# Patient Record
Sex: MALE | Race: WHITE
[De-identification: names, ages, dates, MRNs, and addresses within clinical notes are randomized per-mention and may not be internally consistent; named-entity substitution may affect disease eponyms.]

---

## 2018-07-31 ENCOUNTER — HOSPITAL ENCOUNTER (INPATIENT)
Dept: HOSPITAL 62 - ER | Age: 68
LOS: 3 days | Discharge: LEFT BEFORE DISCHARGE | DRG: 682 | End: 2018-08-03
Attending: HOSPITALIST | Admitting: HOSPITALIST
Payer: COMMERCIAL

## 2018-07-31 DIAGNOSIS — M62.82: ICD-10-CM

## 2018-07-31 DIAGNOSIS — Z86.718: ICD-10-CM

## 2018-07-31 DIAGNOSIS — G93.41: ICD-10-CM

## 2018-07-31 DIAGNOSIS — E11.9: ICD-10-CM

## 2018-07-31 DIAGNOSIS — N17.9: Primary | ICD-10-CM

## 2018-07-31 PROCEDURE — 99285 EMERGENCY DEPT VISIT HI MDM: CPT

## 2018-07-31 PROCEDURE — 81001 URINALYSIS AUTO W/SCOPE: CPT

## 2018-07-31 PROCEDURE — 80307 DRUG TEST PRSMV CHEM ANLYZR: CPT

## 2018-07-31 PROCEDURE — 93005 ELECTROCARDIOGRAM TRACING: CPT

## 2018-07-31 PROCEDURE — 82550 ASSAY OF CK (CPK): CPT

## 2018-07-31 PROCEDURE — 36415 COLL VENOUS BLD VENIPUNCTURE: CPT

## 2018-07-31 PROCEDURE — G0378 HOSPITAL OBSERVATION PER HR: HCPCS

## 2018-07-31 PROCEDURE — 84484 ASSAY OF TROPONIN QUANT: CPT

## 2018-07-31 PROCEDURE — 71045 X-RAY EXAM CHEST 1 VIEW: CPT

## 2018-07-31 PROCEDURE — 93010 ELECTROCARDIOGRAM REPORT: CPT

## 2018-07-31 PROCEDURE — 80053 COMPREHEN METABOLIC PANEL: CPT

## 2018-07-31 PROCEDURE — 82962 GLUCOSE BLOOD TEST: CPT

## 2018-07-31 PROCEDURE — 96360 HYDRATION IV INFUSION INIT: CPT

## 2018-07-31 PROCEDURE — 85025 COMPLETE CBC W/AUTO DIFF WBC: CPT

## 2018-07-31 PROCEDURE — 96361 HYDRATE IV INFUSION ADD-ON: CPT

## 2018-07-31 PROCEDURE — 70450 CT HEAD/BRAIN W/O DYE: CPT

## 2018-07-31 PROCEDURE — 82140 ASSAY OF AMMONIA: CPT

## 2018-08-01 LAB
ADD MANUAL DIFF: NO
ALBUMIN SERPL-MCNC: 5.1 G/DL (ref 3.5–5)
ALP SERPL-CCNC: 73 U/L (ref 38–126)
ALT SERPL-CCNC: 101 U/L (ref 21–72)
ANION GAP SERPL CALC-SCNC: 18 MMOL/L (ref 5–19)
APAP SERPL-MCNC: < 10 UG/ML (ref 10–30)
APPEARANCE UR: (no result)
APTT PPP: (no result) S
AST SERPL-CCNC: 100 U/L (ref 17–59)
BARBITURATES UR QL SCN: NEGATIVE
BASOPHILS # BLD AUTO: 0 10^3/UL (ref 0–0.2)
BASOPHILS NFR BLD AUTO: 0.2 % (ref 0–2)
BILIRUB DIRECT SERPL-MCNC: 0.4 MG/DL (ref 0–0.4)
BILIRUB SERPL-MCNC: 2 MG/DL (ref 0.2–1.3)
BILIRUB UR QL STRIP: NEGATIVE
BUN SERPL-MCNC: 34 MG/DL (ref 7–20)
CALCIUM: 10 MG/DL (ref 8.4–10.2)
CHLORIDE SERPL-SCNC: 105 MMOL/L (ref 98–107)
CK SERPL-CCNC: 2399 U/L (ref 55–170)
CO2 SERPL-SCNC: 24 MMOL/L (ref 22–30)
EOSINOPHIL # BLD AUTO: 0 10^3/UL (ref 0–0.6)
EOSINOPHIL NFR BLD AUTO: 0.2 % (ref 0–6)
ERYTHROCYTE [DISTWIDTH] IN BLOOD BY AUTOMATED COUNT: 13.4 % (ref 11.5–14)
ETHANOL SERPL-MCNC: < 10 MG/DL
GLUCOSE SERPL-MCNC: 150 MG/DL (ref 75–110)
GLUCOSE UR STRIP-MCNC: NEGATIVE MG/DL
HCT VFR BLD CALC: 49.9 % (ref 37.9–51)
HGB BLD-MCNC: 16.9 G/DL (ref 13.5–17)
KETONES UR STRIP-MCNC: (no result) MG/DL
LYMPHOCYTES # BLD AUTO: 1.8 10^3/UL (ref 0.5–4.7)
LYMPHOCYTES NFR BLD AUTO: 13.2 % (ref 13–45)
MCH RBC QN AUTO: 30.9 PG (ref 27–33.4)
MCHC RBC AUTO-ENTMCNC: 33.8 G/DL (ref 32–36)
MCV RBC AUTO: 91 FL (ref 80–97)
METHADONE UR QL SCN: NEGATIVE
MONOCYTES # BLD AUTO: 1.2 10^3/UL (ref 0.1–1.4)
MONOCYTES NFR BLD AUTO: 9 % (ref 3–13)
NEUTROPHILS # BLD AUTO: 10.6 10^3/UL (ref 1.7–8.2)
NEUTS SEG NFR BLD AUTO: 77.4 % (ref 42–78)
NITRITE UR QL STRIP: NEGATIVE
PCP UR QL SCN: NEGATIVE
PH UR STRIP: 5 [PH] (ref 5–9)
PLATELET # BLD: 290 10^3/UL (ref 150–450)
POTASSIUM SERPL-SCNC: 4.6 MMOL/L (ref 3.6–5)
PROT SERPL-MCNC: 8.5 G/DL (ref 6.3–8.2)
PROT UR STRIP-MCNC: 30 MG/DL
RBC # BLD AUTO: 5.46 10^6/UL (ref 4.35–5.55)
SALICYLATES SERPL-MCNC: < 1 MG/DL (ref 2–20)
SODIUM SERPL-SCNC: 146.9 MMOL/L (ref 137–145)
SP GR UR STRIP: 1.02
TOTAL CELLS COUNTED % (AUTO): 100 %
URINE AMPHETAMINES SCREEN: NEGATIVE
URINE BENZODIAZEPINES SCREEN: NEGATIVE
URINE COCAINE SCREEN: NEGATIVE
URINE MARIJUANA (THC) SCREEN: NEGATIVE
UROBILINOGEN UR-MCNC: 2 MG/DL (ref ?–2)
WBC # BLD AUTO: 13.7 10^3/UL (ref 4–10.5)

## 2018-08-01 PROCEDURE — 3E0F73Z INTRODUCTION OF ANTI-INFLAMMATORY INTO RESPIRATORY TRACT, VIA NATURAL OR ARTIFICIAL OPENING: ICD-10-PCS | Performed by: HOSPITALIST

## 2018-08-01 RX ADMIN — SODIUM CHLORIDE PRN MLS/HR: 9 INJECTION, SOLUTION INTRAVENOUS at 02:13

## 2018-08-01 RX ADMIN — DOXYCYCLINE SCH MLS/HR: 100 INJECTION, POWDER, LYOPHILIZED, FOR SOLUTION INTRAVENOUS at 10:44

## 2018-08-01 RX ADMIN — HALOPERIDOL LACTATE PRN MG: 5 INJECTION, SOLUTION INTRAMUSCULAR at 10:45

## 2018-08-01 RX ADMIN — ENOXAPARIN SODIUM SCH MG: 30 INJECTION SUBCUTANEOUS at 10:45

## 2018-08-01 RX ADMIN — DOXYCYCLINE SCH MLS/HR: 100 INJECTION, POWDER, LYOPHILIZED, FOR SOLUTION INTRAVENOUS at 22:04

## 2018-08-01 RX ADMIN — HALOPERIDOL LACTATE PRN MG: 5 INJECTION, SOLUTION INTRAMUSCULAR at 18:15

## 2018-08-01 RX ADMIN — HALOPERIDOL LACTATE PRN MG: 5 INJECTION, SOLUTION INTRAMUSCULAR at 13:09

## 2018-08-01 NOTE — EKG REPORT
SEVERITY:- OTHERWISE NORMAL ECG -

SINUS TACHYCARDIA

:

Confirmed by: Ruben Levin 01-Aug-2018 21:21:00

## 2018-08-01 NOTE — ER DOCUMENT REPORT
ED General





- General


Chief Complaint: Altered Mental Status


Stated Complaint: ALTERED MENTAL STATUS


Time Seen by Provider: 08/01/18 00:00


Notes: 





Patient is a 68-year-old male who comes emergency department for chief 

complaint of altered mental status.  He is here with his grandson.  Grandson 

stays with him, states he last saw him yesterday, states he came home today and 

found him walking around in the yard swatting at things that were not there.  

He was completely confused.  He is normally completely oriented and does not 

have a history of dementia.  No trauma, fever, or other abnormalities reported.

  On my examination patient will follow directions but he is unable to answer 

questions about what happened or where he is.  Past medical history of insulin-

dependent diabetes, son states that he was complaining of pain over his left 

ribs after he pulled a muscle (denies any fall or trauma), states he was 

prescribed this a couple of days ago and did not take more than the prescribed 

amount as far as he knows.  He comes by EMS.





Past Medical History





- General


Information source: Relative - grandson





- Social History


Smoking Status: Never Smoker


Frequency of alcohol use: None


Drug Abuse: None


Lives with: Family


Family History: Reviewed & Not Pertinent


Patient has suicidal ideation: No


Patient has homicidal ideation: No





- Past Medical History


Cardiac Medical History: Reports: Hx DVT


Endocrine Medical History: Reports: Hx Diabetes Mellitus Type 2


Renal/ Medical History: Denies: Hx Peritoneal Dialysis





- Immunizations


Hx Diphtheria, Pertussis, Tetanus Vaccination: Yes





Review of Systems





- Review of Systems


Constitutional: See HPI


EENT: No symptoms reported


Cardiovascular: No symptoms reported


Respiratory: No symptoms reported


Gastrointestinal: No symptoms reported


Genitourinary: No symptoms reported


Male Genitourinary: No symptoms reported


Musculoskeletal: No symptoms reported


Skin: No symptoms reported


Hematologic/Lymphatic: No symptoms reported


Neurological/Psychological: See HPI





Physical Exam





- Vital signs


Vitals: 


 











Temp Pulse Resp BP Pulse Ox


 


 97.6 F   102 H  19   123/74   90 L


 


 07/31/18 23:59  07/31/18 23:59  07/31/18 23:59  07/31/18 23:59  07/31/18 23:59














- Notes


Notes: 





GENERAL: Alert, no distress, disheveled appearance


HEAD: Normocephalic, atraumatic.


EYES: Pupils equal, round, and reactive to light. Extraocular movements intact.


ENT: Oral mucosa moist, tongue midline. 


NECK: Full range of motion. Supple. Trachea midline.


LUNGS: Clear to auscultation bilaterally, no wheezes, rales, or rhonchi. No 

respiratory distress.  Occasional mild cough.


HEART: Tachycardic, normal rhythm, no murmur


ABDOMEN: Soft, non-tender. Non-distended. Bowel sounds present in all 4 

quadrants.


EXTREMITIES: Moves all 4 extremities spontaneously. No edema, normal radial and 

dorsalis pedis pulses bilaterally. No cyanosis.


BACK: no cervical, thoracic, lumbar midline tenderness. No saddle anesthesia, 

normal distal neurovascular exam. 


NEUROLOGICAL: Patient is alert, he follows directions, however he is confused.  

He is unable to tell me where he is, who he is with, or the events of today.  

Normal upper and lower extremity strength, neurovascular exam.  Cranial nerves 

grossly intact.


SKIN: Warm, dry, normal turgor. No rashes or lesions noted.








Course





- Re-evaluation


Re-evalutation: 


Patient is altered clearly on exam although he will follow directions.  CAT 

scan of the head unremarkable, chest x-ray with no overt abnormality.  Patient 

has no concerning physical exam findings, has mild cough, has had cough for a 

few days, mild leukocytosis, no fever.  No overt evidence of pneumonia.  

Borderline hypoxia when he falls asleep, placed on 2 L nasal cannula and this 

resolved.





Chemistry shows renal insufficiency, LFTs mildly elevated, direct bilirubin 

unremarkable, nontender abdomen.  CK is elevated at greater than 2300, troponin 

indeterminate.  Patient given IV fluids, will be given more.  Urinalysis shows 

dehydration but no infection.





Patient has improved since he arrived, he is more cooperative and calm, however 

he remains confused.  Unsure if this is metabolic, medication related, or other 

etiology.  Will admit for altered mental status, acute renal insufficiency, 

rhabdomyolysis.  Discussed this with grandson.  Patient actually states 

agreement with staying as well.





Discussed with Dr. Barriga, patient will be admitted to telemetry full admission.





- Vital Signs


Vital signs: 


 











Temp Pulse Resp BP Pulse Ox


 


 97.8 F   86   20   135/71 H  97 


 


 08/01/18 04:49  08/01/18 04:49  08/01/18 04:49  08/01/18 04:49  08/01/18 04:49














- Laboratory


Result Diagrams: 


 07/31/18 22:36





 08/01/18 00:50


Laboratory results interpreted by me: 


 











  07/31/18 08/01/18 08/01/18





  22:36 00:37 00:50


 


WBC  13.7 H  


 


Absolute Neutrophils  10.6 H  


 


Sodium    146.9 H


 


BUN    34 H


 


Creatinine    1.70 H


 


Est GFR ( Amer)    49 L


 


Est GFR (Non-Af Amer)    40 L


 


Glucose    150 H


 


Total Bilirubin    2.0 H


 


AST    100 H


 


ALT    101 H


 


Creatine Kinase    2399 H


 


Total Protein    8.5 H


 


Albumin    5.1 H


 


Urine Protein   30 H 


 


Urine Ketones   TRACE H 


 


Urine Blood   SMALL H 


 


Urine Urobilinogen   2.0 H 


 


Salicylates    < 1.0 L


 


Acetaminophen    < 10 L














Discharge





- Discharge


Clinical Impression: 


 Acute renal insufficiency





Altered mental status


Qualifiers:


 Altered mental status type: unspecified Qualified Code(s): R41.82 - Altered 

mental status, unspecified





Rhabdomyolysis


Qualifiers:


 Rhabdomyolysis type: non-traumatic Qualified Code(s): M62.82 - Rhabdomyolysis





Condition: Stable


Disposition: ADMITTED AS INPATIENT


Admitting Provider: Hospitalist


Unit Admitted: Telemetry

## 2018-08-01 NOTE — PROGRESS NOTE
Provider Note


Provider Note: 


This is 68 years old  male patient admitted this morning for altered 

mental status.  So far no explanation for his acute mental status changes 

except he is dehydrated and has acute kidney injury.  Accept this patient and 

his primary attending.

## 2018-08-01 NOTE — RADIOLOGY REPORT (SQ)
CT HEAD NON CONTRAST



Clinical history: 

Acute mental status change



Technique: 

Multiple axial images are taken from the level the vertex down to

the base of the skull  without the use of IV contrast. Sagittal

and coronal reconstructed images are also performed. Exam was

performed using the lowest radiation dose possible to allow for

diagnostic images.



This exam was performed according to our departmental

dose-optimization program which includes use of Automated

Exposure Control, adjustment of the mA and/or kV according to

patient size and/or use of iterative reconstruction technique. 



Comparison:

None.



Findings:



There is no evidence for acute intraparenchymal hemorrhage. 

There is no midline shift. 

There is no abnormal extra-axial fluid collection. 

Ventricles measure within normal limits. 

Sinuses are well aerated. 

Mastoid air cells well aerated. 

No evidence for depressed calvarial skull fracture. 

Soft tissues are grossly unremarkable.



Impression:

No noncontrast CT evidence for acute intracranial pathology.

## 2018-08-01 NOTE — PDOC H&P
History of Present Illness


Admission Date/PCP: 


  08/01/18 02:25





  





Patient complains of: Altered mental status


History of Present Illness: 


NANCI ZHANG is a 68 year old male with a past medical history of diabetes 

who comes emergency department for chief complaint of altered mental status.  

He is here with his grandson.  Grandson stays with him, states he last saw him 

yesterday, states he came home today and found him walking around in the yard 

swatting at things that were not there.  He was completely confused.  He is 

normally completely oriented and does not have a history of dementia.  No trauma

, fever, or other abnormalities reported.  Patient is able to follow directions 

but he is unable to answer questions about what happened or where he is.  As 

per his son states that he was complaining of pain over his left ribs after he 

pulled a muscle (denies any fall or trauma), states he was prescribed Flexeril  

a couple of days ago and did not take more than the prescribed amount as far as 

he knows.  No history of abdominal pain no chest pain or shortness of breath 

nausea vomiting or diarrhea.  No history of urinary symptoms.  No focal 

weakness or numbness.





On arrival to the emergency nasal vitals are stable.  His laboratory workup 

showed white count of 13,000.  His chemistry showed sodium of 147 with BUN of 

34 and creatinine was 1.7.  No baseline labs available to compare.  His CK was 

2400.  Troponin was negative.  UA was unremarkable.  Urine toxicology was 

unremarkable.  CT head was unremarkable.  Chest x-ray shows cardiomegaly 

without acute process.


Patient was referred to hospital service for further workup of altered mental 

status.








Past Medical History


Cardiac Medical History: Reports: DVT


Endocrine Medical History: Reports: Diabetes Mellitus Type 2





Past Surgical History


Past Surgical History: Reports: None





Social History


Information Source: Relative


Lives with: Family


Smoking Status: Never Smoker


Last Time Smoked: 30 years ago


Frequency of Alcohol Use: None


Hx Recreational Drug Use: No


Drugs: None


Hx Prescription Drug Abuse: No





Family History


Family History: Reviewed & Not Pertinent


Parental Family History Reviewed: No


Children Family History Reviewed: No


Sibling(s) Family History Reviewed.: No





Review of Systems


All systems: reviewed and no additional remarkable complaints except as stated





Physical Exam


Vital Signs: 


 











Temp Pulse Resp BP Pulse Ox


 


 97.8 F   86   20   135/71 H  97 


 


 08/01/18 04:49  08/01/18 04:49  08/01/18 04:49  08/01/18 04:49  08/01/18 04:49








 Intake & Output











 07/30/18 07/31/18 08/01/18





 06:59 06:59 06:59


 


Weight   210 lb 8.663 oz











General appearance: PRESENT: no acute distress, well-developed, well-nourished


Head exam: PRESENT: atraumatic, normocephalic


Eye exam: ABSENT: conjunctival injection, conjunctiva pink, conjunctiva pale, 

EOMI, nystagmus, periorbital swelling, PERRLA, scleral icterus, other


Ear exam: ABSENT: bleeding, drainage, normal external ear exam, TM's normal 

bilaterally, other


Mouth exam: PRESENT: moist


Teeth exam: ABSENT: dental caries, dental tenderness, edentulous, poor dentation

, other


Neck exam: ABSENT: carotid bruit, JVD, meningismus, thyromegaly


Respiratory exam: PRESENT: clear to auscultation nevaeh.  ABSENT: crackles, rhonchi


Cardiovascular exam: PRESENT: RRR, +S1, +S2.  ABSENT: gallop, rubs, systolic 

murmur


GI/Abdominal exam: PRESENT: normal bowel sounds, soft.  ABSENT: organolmegaly, 

tenderness


Rectal exam: PRESENT: deferred


Gentrourinary exam: ABSENT: ecchymosis, erythema, lacerations, lesions, scrotal 

swelling, testicular tenderness, urethral discharge, indwelling catheter, other


Extremities exam: ABSENT: calf tenderness, clubbing, full ROM, joint swelling, 

pedal edema, tenderness, +1 edema, +2 edema, other


Musculoskeletal exam: PRESENT: ambulatory


Neurological exam: PRESENT: alert, awake, CN II-XII grossly intact.  ABSENT: 

oriented to place, oriented to time, oriented to situation, motor sensory 

deficit


Psychiatric exam: ABSENT: homicidal ideation, suicidal ideation


Focused psych exam: PRESENT: delusional, restlessness


Skin exam: PRESENT: rash





Results


Laboratory Results: 


Labs reviewed.





Impressions: 


 





Chest X-Ray  08/01/18 00:08


IMPRESSION:


Cardiomegaly which may be accentuated by low lung volumes.


 











Status: Image reviewed by me





Assessment & Plan





- Diagnosis


(1) Altered mental status


Qualifiers: 


   Altered mental status type: disorientation   Qualified Code(s): R41.0 - 

Disorientation, unspecified   


Is this a current diagnosis for this admission?: Yes   


Plan: 


Patient with confusion and disorientation of unknown etiology.  CT head is 

negative for acute process.  No focal deficit on exam.  Likely metabolic 

secondary to renal insufficiency and rhabdomyolysis.  No fever or signs of 

meningitis.  We will continue frequent neuro check.  Continue IV fluids.  Hold 

Flexeril.  Hold sedating medication.  If no change in mental status we will 

consider MRI.








(2) Acute renal insufficiency


Is this a current diagnosis for this admission?: Yes   


Plan: 


Unknown baseline creatinine.  Will continue IV fluid and recheck BMP in a.m.








(3) Rhabdomyolysis


Qualifiers: 


   Rhabdomyolysis type: non-traumatic   Qualified Code(s): M62.82 - 

Rhabdomyolysis   


Is this a current diagnosis for this admission?: Yes   


Plan: 


Patient with rhabdomyolysis on unknown etiology.  Continue IV fluids.  Patient 

has mild renal insufficiency as well.  Will recheck CK in the morning..








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


Is this a current diagnosis for this admission?: No   


Plan: 


Patient is currently on insulin.  We will continue insulin sliding scale.








- Time


Time Spent: 50 to 70 Minutes





- Inpatient Certification


Based on my medical assessment, after consideration of the patient's 

comorbidities, presenting symptoms, or acuity I expect that the services needed 

warrant INPATIENT care.: No


I certify that my determination is in accordance with my understanding of 

Medicare's requirements for reasonable and necessary INPATIENT services [42 CFR 

412.3e].: No

## 2018-08-01 NOTE — RADIOLOGY REPORT (SQ)
CHEST XRAY 



CLINICAL HISTORY: Chest pain



COMPARISON: None.



TECHNIQUE: 2  view chest is submitted for review.



FINDINGS:

Lung volumes are low without evidence for acute infiltrate or

effusion. Eventration of the right hemidiaphragm is demonstrated.

The cardiac silhouette is enlarged,  Pulmonary vascularity is

unremarkable. Osseous structures are within expected limits for

patients age. .



IMPRESSION:

Cardiomegaly which may be accentuated by low lung volumes.

## 2018-08-02 LAB
ADD MANUAL DIFF: NO
ALBUMIN SERPL-MCNC: 4 G/DL (ref 3.5–5)
ALP SERPL-CCNC: 55 U/L (ref 38–126)
ALT SERPL-CCNC: 86 U/L (ref 21–72)
ANION GAP SERPL CALC-SCNC: 11 MMOL/L (ref 5–19)
AST SERPL-CCNC: 93 U/L (ref 17–59)
BASOPHILS # BLD AUTO: 0 10^3/UL (ref 0–0.2)
BASOPHILS NFR BLD AUTO: 0.3 % (ref 0–2)
BILIRUB DIRECT SERPL-MCNC: 0.3 MG/DL (ref 0–0.4)
BILIRUB SERPL-MCNC: 1.7 MG/DL (ref 0.2–1.3)
BUN SERPL-MCNC: 23 MG/DL (ref 7–20)
CALCIUM: 8.7 MG/DL (ref 8.4–10.2)
CHLORIDE SERPL-SCNC: 105 MMOL/L (ref 98–107)
CK SERPL-CCNC: 1413 U/L (ref 55–170)
CO2 SERPL-SCNC: 27 MMOL/L (ref 22–30)
EOSINOPHIL # BLD AUTO: 0.1 10^3/UL (ref 0–0.6)
EOSINOPHIL NFR BLD AUTO: 1.7 % (ref 0–6)
ERYTHROCYTE [DISTWIDTH] IN BLOOD BY AUTOMATED COUNT: 13.3 % (ref 11.5–14)
GLUCOSE SERPL-MCNC: 110 MG/DL (ref 75–110)
HCT VFR BLD CALC: 40.8 % (ref 37.9–51)
HGB BLD-MCNC: 13.9 G/DL (ref 13.5–17)
LYMPHOCYTES # BLD AUTO: 1.7 10^3/UL (ref 0.5–4.7)
LYMPHOCYTES NFR BLD AUTO: 22.2 % (ref 13–45)
MCH RBC QN AUTO: 31.3 PG (ref 27–33.4)
MCHC RBC AUTO-ENTMCNC: 34 G/DL (ref 32–36)
MCV RBC AUTO: 92 FL (ref 80–97)
MONOCYTES # BLD AUTO: 0.6 10^3/UL (ref 0.1–1.4)
MONOCYTES NFR BLD AUTO: 8.1 % (ref 3–13)
NEUTROPHILS # BLD AUTO: 5.2 10^3/UL (ref 1.7–8.2)
NEUTS SEG NFR BLD AUTO: 67.7 % (ref 42–78)
PLATELET # BLD: 176 10^3/UL (ref 150–450)
POTASSIUM SERPL-SCNC: 3.6 MMOL/L (ref 3.6–5)
PROT SERPL-MCNC: 6.7 G/DL (ref 6.3–8.2)
RBC # BLD AUTO: 4.43 10^6/UL (ref 4.35–5.55)
SODIUM SERPL-SCNC: 142.8 MMOL/L (ref 137–145)
TOTAL CELLS COUNTED % (AUTO): 100 %
WBC # BLD AUTO: 7.7 10^3/UL (ref 4–10.5)

## 2018-08-02 RX ADMIN — ENOXAPARIN SODIUM SCH MG: 30 INJECTION SUBCUTANEOUS at 10:25

## 2018-08-02 RX ADMIN — DOXYCYCLINE SCH MLS/HR: 100 INJECTION, POWDER, LYOPHILIZED, FOR SOLUTION INTRAVENOUS at 21:50

## 2018-08-02 RX ADMIN — DOXYCYCLINE SCH MLS/HR: 100 INJECTION, POWDER, LYOPHILIZED, FOR SOLUTION INTRAVENOUS at 10:24

## 2018-08-02 RX ADMIN — SODIUM CHLORIDE PRN MLS/HR: 9 INJECTION, SOLUTION INTRAVENOUS at 10:25

## 2018-08-02 NOTE — PDOC PROGRESS REPORT
Subjective


Subjective:: 


This is 68 years old  male patient who does not have significant 

medical history except type 2 diabetes mellitus brought by EMS with chief 

complaint of altered mental status.  His initial workup is unremarkable except 

mild rhabdomyolysis.  CT scan of the head is negative.  MRI of the brain could 

not be done since patient is claustrophobic and Ativan and Haldol could not 

calm him down.  This morning I seen patient with his grandson in the room, 

patient is awake alert oriented and more cooperative.  I started him on 

doxycycline empirically because I seen nonblanching petechial rash over both 

his ankles but this morning he told me it is from chiggers bite.  I feel his 

altered mental state is more of due to dehydration he states also that she will 

have the MRI as outpatient.


Reason For Visit: 


ALTERED MENTAL STATUS, ACUTE RENAL FAILURE








Physical Exam


Vital Signs: 


 











Temp Pulse Resp BP Pulse Ox


 


 98.1 F   90   18   141/65 H  93 


 


 08/02/18 12:01  08/02/18 13:03  08/02/18 13:03  08/02/18 12:01  08/02/18 13:03








 Intake & Output











 08/01/18 08/02/18 08/03/18





 06:59 06:59 06:59


 


Intake Total  740 


 


Output Total  500 


 


Balance  240 


 


Weight  97.3 kg 











General appearance: PRESENT: no acute distress, well-developed, well-nourished


Head exam: PRESENT: atraumatic, normocephalic


Eye exam: PRESENT: conjunctiva pink, EOMI, PERRLA.  ABSENT: scleral icterus


Ear exam: PRESENT: normal external ear exam


Mouth exam: PRESENT: moist, tongue midline


Neck exam: ABSENT: carotid bruit, JVD, lymphadenopathy, thyromegaly


Respiratory exam: PRESENT: clear to auscultation nevaeh.  ABSENT: rales, rhonchi, 

wheezes


Cardiovascular exam: PRESENT: RRR.  ABSENT: diastolic murmur, rubs, systolic 

murmur


Pulses: PRESENT: normal dorsalis pedis pul


Vascular exam: PRESENT: normal capillary refill


GI/Abdominal exam: PRESENT: normal bowel sounds, soft.  ABSENT: distended, 

guarding, mass, organolmegaly, rebound, tenderness


Rectal exam: PRESENT: deferred


Extremities exam: PRESENT: full ROM.  ABSENT: calf tenderness, clubbing, pedal 

edema


Neurological exam: PRESENT: alert, awake, oriented to person, oriented to place

, oriented to time, oriented to situation, CN II-XII grossly intact.  ABSENT: 

motor sensory deficit


Psychiatric exam: PRESENT: appropriate affect, normal mood.  ABSENT: homicidal 

ideation, suicidal ideation


Skin exam: PRESENT: dry, intact, warm.  ABSENT: cyanosis, rash





Results


Laboratory Results: 


 





 08/02/18 04:36 





 08/02/18 04:36 





 











  08/02/18 08/02/18





  04:36 04:36


 


WBC  7.7 


 


RBC  4.43 


 


Hgb  13.9  D 


 


Hct  40.8 


 


MCV  92 


 


MCH  31.3 


 


MCHC  34.0 


 


RDW  13.3 


 


Plt Count  176 


 


Seg Neutrophils %  67.7 


 


Lymphocytes %  22.2 


 


Monocytes %  8.1 


 


Eosinophils %  1.7 


 


Basophils %  0.3 


 


Absolute Neutrophils  5.2 


 


Absolute Lymphocytes  1.7 


 


Absolute Monocytes  0.6 


 


Absolute Eosinophils  0.1 


 


Absolute Basophils  0.0 


 


Sodium   142.8


 


Potassium   3.6


 


Chloride   105


 


Carbon Dioxide   27


 


Anion Gap   11


 


BUN   23 H


 


Creatinine   0.82


 


Est GFR ( Amer)   > 60


 


Est GFR (Non-Af Amer)   > 60


 


Glucose   110


 


Calcium   8.7


 


Total Bilirubin   1.7 H


 


AST   93 H


 


ALT   86 H


 


Alkaline Phosphatase   55


 


Total Protein   6.7


 


Albumin   4.0








 











  08/02/18





  04:36


 


Creatine Kinase  1413 H











Impressions: 


 





Chest X-Ray  08/01/18 00:08


IMPRESSION:


Cardiomegaly which may be accentuated by low lung volumes.


 














Assessment & Plan





- Diagnosis


(1) Altered mental status


Is this a current diagnosis for this admission?: Yes   


Plan: 


Due to metabolic encephalopathy.  Now has resolved








(2) Acute kidney injury


Is this a current diagnosis for this admission?: Yes   


Plan: 


Continue gentle hydration








(3) Type 2 diabetes mellitus


Is this a current diagnosis for this admission?: Yes   


Plan: 


Diet controlled

## 2018-08-03 VITALS — DIASTOLIC BLOOD PRESSURE: 66 MMHG | SYSTOLIC BLOOD PRESSURE: 151 MMHG

## 2018-08-03 RX ADMIN — SODIUM CHLORIDE PRN MLS/HR: 9 INJECTION, SOLUTION INTRAVENOUS at 09:20

## 2018-08-03 RX ADMIN — SODIUM CHLORIDE PRN MLS/HR: 9 INJECTION, SOLUTION INTRAVENOUS at 00:44

## 2018-08-03 RX ADMIN — DOXYCYCLINE SCH MLS/HR: 100 INJECTION, POWDER, LYOPHILIZED, FOR SOLUTION INTRAVENOUS at 10:25

## 2018-08-03 NOTE — PDOC DISCHARGE SUMMARY
General





- Admit/Disc Date/PCP


Admission Date/Primary Care Provider: 


  08/01/18 13:26





  





Discharge Date: 08/03/18 - patient left AMA





- Discharge Diagnosis


(1) Acute kidney injury


Is this a current diagnosis for this admission?: Yes   


Summary: 


improved.  but patient left AMA before i could evaluate him this morning








(2) Rhabdomyolysis


Is this a current diagnosis for this admission?: Yes   


Summary: 


improving but still high.  patients son did not want to stay in hospital since 

they want to go to [place] for open MRI.  they wanted to leave AMA








- Additional Information


Home Medications: 








Unobtainable [Unobtainable]  08/01/18 











History of Present Illness


History of Present Illness: 


NANCI ZHANG is a 68 year old male








Hospital Course


Hospital Course: 





patient admitted for MOIZ and rhabdo-  called from nurse this morning stating 

that patients wants discharge now.  told her that I am rounding in the ICU and 

I will be there very soon.  went to evaluate patient but he was already getting 

dressed and ready to go.  spoke to him about his results of rhabdo and that he 

needs more IV fluids.  his son states that patient needs MRI now and since 

patient cannot tolerate MRI in our machine - they have found a machine in 

another city which is OPEN MRI and son is going to take patient there. I tried 

to explain to patient about MRI and our CT scan results but son and patient 

doesn't want to stay. states they need MRI now and that is more important 

fluids.  neither patient nor son gave me a change to explain and brushed me off 

and wanted to leave now.  i told them that leaving would be against medical 

advice and this could leave to worsening signs/symptoms and even death.  they 

verbalized understanding but still demanding to go.  





Physical Exam


Vital Signs: 


 











Temp Pulse Resp BP Pulse Ox


 


 98.8 F   78   20   151/66 H  96 


 


 08/03/18 08:20  08/03/18 08:20  08/03/18 08:20  08/03/18 08:20  08/03/18 08:20








 Intake & Output











 08/02/18 08/03/18 08/04/18





 06:59 06:59 06:59


 


Intake Total 740 2019 1250


 


Output Total 500  


 


Balance 240 2019 1250


 


Weight 214 lb 8.156 oz 214 lb 8.156 oz 











General appearance: PRESENT: other - patient left AMA - unable to perform exam





Results


Laboratory Results: 


 





 08/02/18 04:36 





 08/02/18 04:36 





 











  08/02/18 08/03/18





  04:36 04:51


 


Creatine Kinase  1413 H  724 H











Impressions: 


 





Chest X-Ray  08/01/18 00:08


IMPRESSION:


Cardiomegaly which may be accentuated by low lung volumes.


 














Qualifiers





- *


PATIENT BEING DISCHARGED WITH ANY OF THE FOLLOWING DIAGNOSIS: No - left AMA


VTE patient discharged on overlapping Therapy?: No





Plan


Discharge Plan: 





patient left AMA

## 2018-10-01 ENCOUNTER — HOSPITAL ENCOUNTER (OUTPATIENT)
Dept: HOSPITAL 62 - RAD | Age: 68
End: 2018-10-01
Attending: FAMILY MEDICINE
Payer: MEDICARE

## 2018-10-01 DIAGNOSIS — K76.0: Primary | ICD-10-CM

## 2018-10-01 DIAGNOSIS — K80.20: ICD-10-CM

## 2018-10-01 PROCEDURE — 76705 ECHO EXAM OF ABDOMEN: CPT

## 2018-10-01 NOTE — RADIOLOGY REPORT (SQ)
EXAM DESCRIPTION:  U/S ABDOMEN LIMITED W/O DOP



COMPLETED DATE/TIME:  10/1/2018 10:56 am



REASON FOR STUDY:  FATTY LIVER (K76.0) K76.0  FATTY (CHANGE OF) LIVER, NOT ELSEWHERE CLASSIFIED



COMPARISON:  None.



TECHNIQUE:  Dynamic and static grayscale images acquired of the abdomen and recorded on PACS. Additio
nal selected color Doppler and spectral images recorded.



LIMITATIONS:  None.



FINDINGS:  PANCREAS: No masses. No peripancreatic edema or fluid collections.

LIVER: Echotexture is coarse with increased echogenicity consistent with fatty infiltration.

LIVER VASCULATURE: Normal directional flow of the main portal vein and hepatic veins.

GALLBLADDER: Gallstone(s). No pericholecystic fluid. No wall thickening.

ULTRASOUND-DETECTED MOSS'S SIGN: Negative.

INTRAHEPATIC DUCTS AND COMMON DUCT: CBD and intrahepatic ducts normal caliber. No filling defects.

INFERIOR VENA CAVA: Normal flow.

AORTA: No aneurysm.

RIGHT KIDNEY:  Normal size.   Normal echogenicity.   No solid or suspicious masses.   No hydronephros
is.   No calcifications.

PERITONEAL AND RIGHT PLEURAL SPACE: No ascites or effusions.

OTHER: No other significant finding.



IMPRESSION:  Cholelithiasis.  No evidence of acute cholecystitis.



TECHNICAL DOCUMENTATION:  JOB ID:  [id number]

 2011 [employer]- All Rights Reserved



Reading location - IP/workstation name: [email or web address]

## 2020-01-24 ENCOUNTER — HOSPITAL ENCOUNTER (OUTPATIENT)
Dept: HOSPITAL 62 - OD | Age: 70
End: 2020-01-24
Attending: FAMILY MEDICINE
Payer: COMMERCIAL

## 2020-01-24 DIAGNOSIS — R05: Primary | ICD-10-CM

## 2020-01-24 PROCEDURE — 71046 X-RAY EXAM CHEST 2 VIEWS: CPT

## 2020-01-24 NOTE — RADIOLOGY REPORT (SQ)
EXAM DESCRIPTION:  CHEST PA/LATERAL



COMPLETED DATE/TIME:  1/24/2020 3:49 pm



REASON FOR STUDY:  COUGH



COMPARISON:  None.



EXAM PARAMETERS:  NUMBER OF VIEWS: two views

TECHNIQUE: Digital Frontal and Lateral radiographic views of the chest acquired.

RADIATION DOSE: NA

LIMITATIONS: none



FINDINGS:  LUNGS AND PLEURA: No opacities, masses or pneumothorax. No pleural effusion.

MEDIASTINUM AND HILAR STRUCTURES: No masses or contour abnormalities.

HEART AND VASCULAR STRUCTURES: Heart normal size.  No evidence for failure.

BONES: No acute findings.

HARDWARE: None in the chest.

OTHER: No other significant finding.



IMPRESSION:  NO SIGNIFICANT RADIOGRAPHIC FINDING IN THE CHEST.



TECHNICAL DOCUMENTATION:  JOB ID:  [id number]

 2011 [employer]- All Rights Reserved



Reading location - IP/workstation name: [email or web address]